# Patient Record
Sex: FEMALE | Race: AMERICAN INDIAN OR ALASKA NATIVE | NOT HISPANIC OR LATINO | URBAN - METROPOLITAN AREA
[De-identification: names, ages, dates, MRNs, and addresses within clinical notes are randomized per-mention and may not be internally consistent; named-entity substitution may affect disease eponyms.]

---

## 2019-06-30 ENCOUNTER — EMERGENCY (EMERGENCY)
Facility: HOSPITAL | Age: 64
LOS: 1 days | Discharge: HOME | End: 2019-06-30
Attending: EMERGENCY MEDICINE
Payer: SELF-PAY

## 2019-06-30 VITALS
OXYGEN SATURATION: 98 % | WEIGHT: 139.99 LBS | RESPIRATION RATE: 16 BRPM | HEART RATE: 79 BPM | TEMPERATURE: 98 F | DIASTOLIC BLOOD PRESSURE: 64 MMHG | HEIGHT: 60 IN | SYSTOLIC BLOOD PRESSURE: 110 MMHG

## 2019-06-30 PROCEDURE — 99283 EMERGENCY DEPT VISIT LOW MDM: CPT | Mod: 25

## 2019-06-30 PROCEDURE — 73562 X-RAY EXAM OF KNEE 3: CPT

## 2019-06-30 PROCEDURE — 99284 EMERGENCY DEPT VISIT MOD MDM: CPT

## 2019-06-30 PROCEDURE — 73562 X-RAY EXAM OF KNEE 3: CPT | Mod: 26,RT

## 2019-06-30 RX ORDER — ACETAMINOPHEN 500 MG
975 TABLET ORAL ONCE
Refills: 0 | Status: COMPLETED | OUTPATIENT
Start: 2019-06-30 | End: 2019-06-30

## 2019-06-30 RX ADMIN — Medication 975 MILLIGRAM(S): at 13:06

## 2019-06-30 NOTE — ED PROVIDER NOTE - OBJECTIVE STATEMENT
62 y/o F patient with no significant PMHx and no significant PSHx presents with in-law to the ED for a diffused rash. In-law reports patient is experiencing itching and bumps for the past x3 weeks. In-law also reports patient experienced a fall x2 weeks ago and endorses right knee pain and back pain. Patient states she took Benadryl and Hydrocortisone cream for her rash x2-x3 a days. Patient denies any other complaints. NKDA.

## 2019-06-30 NOTE — ED PROVIDER NOTE - NSFOLLOWUPINSTRUCTIONS_ED_ALL_ED_FT
Apply Hydrocortisone 34x per day to affected areas.  Take Benadryl every 6hrs as needed for itchiness.   Take 400mg Ibuprofen every 8hrs for the next 5 days.  Drink plenty of fluids.  Follow up with your doctor/Ortho/Dermatology or in the Clinic as discussed within 2 days.  Return to the ER for any concerns.

## 2019-06-30 NOTE — ED PROVIDER NOTE - CARE PROVIDER_API CALL
Carlos Hawthorne)  Orthopaedic Surgery; Sports Medicine  [address] [place] [address]  Phone: [phone]  Fax: [phone]  Follow Up Time:     Broderick Westbrook)  Dermatology  [address] [place] [address] [id number] [place] [id number]  Phone: [phone]  Fax: [phone]  Follow Up Time:

## 2019-06-30 NOTE — ED ADULT NURSE NOTE - NSIMPLEMENTINTERV_GEN_ALL_ED
Implemented All Universal Safety Interventions:  [place] to call system. Call bell, personal items and telephone within reach. Instruct patient to call for assistance. Room bathroom lighting operational. Non-slip footwear when patient is off stretcher. Physically safe environment: no spills, clutter or unnecessary equipment. Stretcher in lowest position, wheels locked, appropriate side rails in place.

## 2019-06-30 NOTE — ED PROVIDER NOTE - CLINICAL SUMMARY MEDICAL DECISION MAKING FREE TEXT BOX
Patient with right knee pain. Obtain X-ray and give analgesia. Patient also has rash. Recommend dermatology f/u and to continue to take Benadryl and Hydrocortisone. Patient with right knee pain. Obtain X-ray and give analgesia. Patient also has rash, likley demraitis. Recommend dermatology f/u and to continue to take Benadryl and Hydrocortisone.

## 2019-06-30 NOTE — ED ADULT TRIAGE NOTE - CHIEF COMPLAINT QUOTE
generalized rash and itching x 2 weeks, pt is taking benadryl and hydrocortisone with no to minimal improvement